# Patient Record
Sex: MALE | Race: WHITE | NOT HISPANIC OR LATINO | Employment: PART TIME | URBAN - METROPOLITAN AREA
[De-identification: names, ages, dates, MRNs, and addresses within clinical notes are randomized per-mention and may not be internally consistent; named-entity substitution may affect disease eponyms.]

---

## 2021-08-03 ENCOUNTER — TRANSCRIBE ORDERS (OUTPATIENT)
Dept: ADMINISTRATIVE | Facility: HOSPITAL | Age: 36
End: 2021-08-03

## 2021-08-03 DIAGNOSIS — Z82.79: Primary | ICD-10-CM

## 2021-09-08 ENCOUNTER — HOSPITAL ENCOUNTER (OUTPATIENT)
Dept: CARDIOLOGY | Facility: HOSPITAL | Age: 36
Discharge: HOME | End: 2021-09-08
Admitting: NURSE PRACTITIONER

## 2021-09-08 VITALS — WEIGHT: 190 LBS

## 2021-09-08 DIAGNOSIS — Z82.79: ICD-10-CM

## 2021-09-08 LAB
ASCENDING AORTA: 2.8 CM
BH CV ECHO MEAS - AO MAX PG (FULL): 5.4 MMHG
BH CV ECHO MEAS - AO MAX PG: 10 MMHG
BH CV ECHO MEAS - AO MEAN PG (FULL): 3 MMHG
BH CV ECHO MEAS - AO MEAN PG: 5 MMHG
BH CV ECHO MEAS - AO ROOT AREA (BSA CORRECTED): 1.3
BH CV ECHO MEAS - AO ROOT AREA: 5.7 CM^2
BH CV ECHO MEAS - AO ROOT DIAM: 2.7 CM
BH CV ECHO MEAS - AO V2 MAX: 162 CM/SEC
BH CV ECHO MEAS - AO V2 MEAN: 99.7 CM/SEC
BH CV ECHO MEAS - AO V2 VTI: 28.7 CM
BH CV ECHO MEAS - ASC AORTA: 2.8 CM
BH CV ECHO MEAS - AVA(I,A): 2.2 CM^2
BH CV ECHO MEAS - AVA(I,D): 2.2 CM^2
BH CV ECHO MEAS - AVA(V,A): 2.1 CM^2
BH CV ECHO MEAS - AVA(V,D): 2.1 CM^2
BH CV ECHO MEAS - BSA(HAYCOCK): 2.1 M^2
BH CV ECHO MEAS - BSA: 2.1 M^2
BH CV ECHO MEAS - BZI_BMI: 25.1 KILOGRAMS/M^2
BH CV ECHO MEAS - BZI_METRIC_HEIGHT: 185.4 CM
BH CV ECHO MEAS - BZI_METRIC_WEIGHT: 86.2 KG
BH CV ECHO MEAS - EDV(CUBED): 66.9 ML
BH CV ECHO MEAS - EDV(MOD-SP2): 60 ML
BH CV ECHO MEAS - EDV(MOD-SP4): 60 ML
BH CV ECHO MEAS - EDV(TEICH): 72.5 ML
BH CV ECHO MEAS - EF(CUBED): 71.9 %
BH CV ECHO MEAS - EF(MOD-BP): 62 %
BH CV ECHO MEAS - EF(MOD-SP2): 61.7 %
BH CV ECHO MEAS - EF(MOD-SP4): 63.3 %
BH CV ECHO MEAS - EF(TEICH): 64.1 %
BH CV ECHO MEAS - ESV(CUBED): 18.8 ML
BH CV ECHO MEAS - ESV(MOD-SP2): 23 ML
BH CV ECHO MEAS - ESV(MOD-SP4): 22 ML
BH CV ECHO MEAS - ESV(TEICH): 26 ML
BH CV ECHO MEAS - FS: 34.5 %
BH CV ECHO MEAS - IVS/LVPW: 0.98
BH CV ECHO MEAS - IVSD: 1 CM
BH CV ECHO MEAS - LA DIMENSION: 3.2 CM
BH CV ECHO MEAS - LA/AO: 1.2
BH CV ECHO MEAS - LAD MAJOR: 4.9 CM
BH CV ECHO MEAS - LAT PEAK E' VEL: 20.9 CM/SEC
BH CV ECHO MEAS - LATERAL E/E' RATIO: 3.3
BH CV ECHO MEAS - LV DIASTOLIC VOL/BSA (35-75): 28.5 ML/M^2
BH CV ECHO MEAS - LV IVRT: 0.09 SEC
BH CV ECHO MEAS - LV MASS(C)D: 131.9 GRAMS
BH CV ECHO MEAS - LV MASS(C)DI: 62.7 GRAMS/M^2
BH CV ECHO MEAS - LV MAX PG: 4.6 MMHG
BH CV ECHO MEAS - LV MEAN PG: 2 MMHG
BH CV ECHO MEAS - LV SYSTOLIC VOL/BSA (12-30): 10.4 ML/M^2
BH CV ECHO MEAS - LV V1 MAX: 107 CM/SEC
BH CV ECHO MEAS - LV V1 MEAN: 65.7 CM/SEC
BH CV ECHO MEAS - LV V1 VTI: 20 CM
BH CV ECHO MEAS - LVIDD: 4.1 CM
BH CV ECHO MEAS - LVIDS: 2.7 CM
BH CV ECHO MEAS - LVLD AP2: 9.2 CM
BH CV ECHO MEAS - LVLD AP4: 9 CM
BH CV ECHO MEAS - LVLS AP2: 7.7 CM
BH CV ECHO MEAS - LVLS AP4: 8 CM
BH CV ECHO MEAS - LVOT AREA (M): 3.1 CM^2
BH CV ECHO MEAS - LVOT AREA: 3.1 CM^2
BH CV ECHO MEAS - LVOT DIAM: 2 CM
BH CV ECHO MEAS - LVPWD: 1 CM
BH CV ECHO MEAS - MED PEAK E' VEL: 11.2 CM/SEC
BH CV ECHO MEAS - MEDIAL E/E' RATIO: 6.1
BH CV ECHO MEAS - MV A MAX VEL: 54.8 CM/SEC
BH CV ECHO MEAS - MV DEC SLOPE: 285 CM/SEC^2
BH CV ECHO MEAS - MV DEC TIME: 0.28 SEC
BH CV ECHO MEAS - MV E MAX VEL: 68.1 CM/SEC
BH CV ECHO MEAS - MV E/A: 1.2
BH CV ECHO MEAS - MV P1/2T MAX VEL: 79.3 CM/SEC
BH CV ECHO MEAS - MV P1/2T: 81.5 MSEC
BH CV ECHO MEAS - MVA P1/2T LCG: 2.8 CM^2
BH CV ECHO MEAS - MVA(P1/2T): 2.7 CM^2
BH CV ECHO MEAS - PA ACC SLOPE: 730.5 CM/SEC^2
BH CV ECHO MEAS - PA ACC TIME: 0.14 SEC
BH CV ECHO MEAS - PA PR(ACCEL): 16.5 MMHG
BH CV ECHO MEAS - SI(AO): 78 ML/M^2
BH CV ECHO MEAS - SI(CUBED): 22.8 ML/M^2
BH CV ECHO MEAS - SI(LVOT): 29.8 ML/M^2
BH CV ECHO MEAS - SI(MOD-SP2): 17.6 ML/M^2
BH CV ECHO MEAS - SI(MOD-SP4): 18 ML/M^2
BH CV ECHO MEAS - SI(TEICH): 22.1 ML/M^2
BH CV ECHO MEAS - SV(AO): 164.3 ML
BH CV ECHO MEAS - SV(CUBED): 48.1 ML
BH CV ECHO MEAS - SV(LVOT): 62.8 ML
BH CV ECHO MEAS - SV(MOD-SP2): 37 ML
BH CV ECHO MEAS - SV(MOD-SP4): 38 ML
BH CV ECHO MEAS - SV(TEICH): 46.5 ML
BH CV ECHO MEAS - TAPSE (>1.6): 2.4 CM
BH CV ECHO MEAS - TR MAX PG: 15 MMHG
BH CV ECHO MEAS - TR MAX VEL: 194 CM/SEC
BH CV ECHO MEASUREMENTS AVERAGE E/E' RATIO: 4.24
BH CV VAS BP RIGHT ARM: NORMAL MMHG
BH CV XLRA - RV BASE: 3 CM
BH CV XLRA - RV LENGTH: 6.1 CM
BH CV XLRA - RV MID: 2.3 CM
BH CV XLRA - TDI S': 12.5 CM/SEC
IVRT: 85 MSEC
LEFT ATRIUM VOLUME INDEX: 15.2 ML/M^2
LEFT ATRIUM VOLUME: 32 ML
MAXIMAL PREDICTED HEART RATE: 182 BPM
STRESS TARGET HR: 155 BPM

## 2021-09-08 PROCEDURE — 93306 TTE W/DOPPLER COMPLETE: CPT

## 2021-09-08 PROCEDURE — 93306 TTE W/DOPPLER COMPLETE: CPT | Performed by: INTERNAL MEDICINE

## 2024-06-09 ENCOUNTER — HOSPITAL ENCOUNTER (EMERGENCY)
Facility: HOSPITAL | Age: 39
Discharge: HOME | End: 2024-06-09
Attending: STUDENT IN AN ORGANIZED HEALTH CARE EDUCATION/TRAINING PROGRAM | Admitting: STUDENT IN AN ORGANIZED HEALTH CARE EDUCATION/TRAINING PROGRAM
Payer: MEDICAID

## 2024-06-09 VITALS
OXYGEN SATURATION: 100 % | HEART RATE: 111 BPM | RESPIRATION RATE: 17 BRPM | WEIGHT: 200 LBS | TEMPERATURE: 98.4 F | HEIGHT: 73 IN | SYSTOLIC BLOOD PRESSURE: 138 MMHG | DIASTOLIC BLOOD PRESSURE: 90 MMHG | BODY MASS INDEX: 26.51 KG/M2

## 2024-06-09 DIAGNOSIS — F19.10 SUBSTANCE ABUSE: Primary | ICD-10-CM

## 2024-06-09 PROCEDURE — 99282 EMERGENCY DEPT VISIT SF MDM: CPT

## 2024-06-10 NOTE — ED PROVIDER NOTES
Subjective   History of Present Illness  Richard is a 38-year-old male who presents for evaluation for detox.  He states that he takes a quarter to half of a Suboxone tablet daily times the past 3 months.  He states that this is prescribed to him.  Reports that his family urged him to come be evaluated for possible detox today.  He denies any suicidal or homicidal thoughts.  He denies any symptoms.  Upon further evaluation, patient states that he does not want a psychiatric evaluation for detox.      Review of Systems   Psychiatric/Behavioral:  Negative for self-injury and suicidal ideas.        No past medical history on file.    No Known Allergies    No past surgical history on file.    No family history on file.    Social History     Socioeconomic History    Marital status: Single           Objective   Physical Exam  Constitutional:       Appearance: Normal appearance. He is normal weight.   HENT:      Head: Normocephalic and atraumatic.      Right Ear: External ear normal.      Left Ear: External ear normal.   Eyes:      Conjunctiva/sclera: Conjunctivae normal.   Cardiovascular:      Rate and Rhythm: Normal rate and regular rhythm.      Pulses: Normal pulses.      Heart sounds: Normal heart sounds.   Pulmonary:      Effort: Pulmonary effort is normal.      Breath sounds: Normal breath sounds.   Abdominal:      General: Abdomen is flat.   Musculoskeletal:         General: Normal range of motion.      Cervical back: Normal range of motion and neck supple.   Skin:     General: Skin is warm and dry.      Capillary Refill: Capillary refill takes less than 2 seconds.   Neurological:      General: No focal deficit present.      Mental Status: He is alert and oriented to person, place, and time.   Psychiatric:         Mood and Affect: Mood normal.         Behavior: Behavior normal.         Thought Content: Thought content does not include homicidal or suicidal ideation. Thought content does not include homicidal or  suicidal plan.         Procedures           ED Course                                             Medical Decision Making  Richard is a 38-year-old male who presents for evaluation for detox.  He states that he takes a quarter to half of a Suboxone tablet daily times the past 3 months.  He states that this is prescribed to him.  Reports that his family urged him to come be evaluated for possible detox today.  He denies any suicidal or homicidal thoughts.  He denies any symptoms    Amount and/or Complexity of Data Reviewed  Labs: ordered.    Risk  Risk Details: ED stay uneventful.  Patient only presented to the emergency department for a psychiatric evaluation for detox due to being urged by his family.  At this time, he does not wish to undergo evaluation.  He denies any suicidal homicidal thoughts.  He will be discharged with instructions for follow-up with his primary care provider.  He will return emergency department any new needs, concerns or changes arise.        Final diagnoses:   Substance abuse       ED Disposition  ED Disposition       ED Disposition   Discharge    Condition   Stable    Comment   --               Milagro Rincon, APRN  [address] [facility] [address]  [phone]    In 3 days      [facility] EMERGENCY DEPARTMENT  [address] [place] [id number] [phone]    If symptoms worsen         Medication List      No changes were made to your prescriptions during this visit.            Ryan Gama, APRN  06/09/24 [id number]

## 2024-06-10 NOTE — NURSING NOTE
Pt to intake. Pt removed all items from his pockets, but states he does not need or want detox. Will complete search if pt remains in Intake to be assessed.

## 2024-06-10 NOTE — NURSING NOTE
Pt denies any withdrawal symptoms from Suboxone. States he is prescribed Suboxone and has been weaning himself off them. Pt has not had any Suboxone for 2 days. Pt is enrolled in an outpt CD program and his next appointment is Tuesday. Pt states he does not need assessment for detox as he is not having any symptoms. Pt states his family wanted him evaluated for detox, but he doesn't need it. Pt denies any SI/HI/AVH. Pt calm, AxOx4. Pt requesting to leave.  Dr. Epperson and MIKE Llamas made aware.

## 2024-06-10 NOTE — NURSING NOTE
Explained pt situation and wishes to leave to Dr. Epperson. Dr Epperson agreeable to pt being discharged.